# Patient Record
Sex: MALE | Race: WHITE | URBAN - METROPOLITAN AREA
[De-identification: names, ages, dates, MRNs, and addresses within clinical notes are randomized per-mention and may not be internally consistent; named-entity substitution may affect disease eponyms.]

---

## 2019-03-07 ENCOUNTER — APPOINTMENT (RX ONLY)
Dept: URBAN - METROPOLITAN AREA CLINIC 16 | Facility: CLINIC | Age: 44
Setting detail: DERMATOLOGY
End: 2019-03-07

## 2019-03-07 DIAGNOSIS — L72.0 EPIDERMAL CYST: ICD-10-CM

## 2019-03-07 DIAGNOSIS — D17 BENIGN LIPOMATOUS NEOPLASM: ICD-10-CM

## 2019-03-07 PROBLEM — M21.70 UNEQUAL LIMB LENGTH (ACQUIRED), UNSPECIFIED SITE: Status: ACTIVE | Noted: 2019-03-07

## 2019-03-07 PROBLEM — D17.21 BENIGN LIPOMATOUS NEOPLASM OF SKIN AND SUBCUTANEOUS TISSUE OF RIGHT ARM: Status: ACTIVE | Noted: 2019-03-07

## 2019-03-07 PROCEDURE — 99202 OFFICE O/P NEW SF 15 MIN: CPT

## 2019-03-07 PROCEDURE — ? COUNSELING

## 2019-03-07 PROCEDURE — ? DIAGNOSIS COMMENT

## 2019-03-07 ASSESSMENT — LOCATION SIMPLE DESCRIPTION DERM
LOCATION SIMPLE: RIGHT UPPER ARM
LOCATION SIMPLE: POSTERIOR NECK

## 2019-03-07 ASSESSMENT — LOCATION ZONE DERM
LOCATION ZONE: NECK
LOCATION ZONE: ARM

## 2019-03-07 ASSESSMENT — LOCATION DETAILED DESCRIPTION DERM
LOCATION DETAILED: RIGHT MEDIAL TRAPEZIAL NECK
LOCATION DETAILED: RIGHT ANTERIOR DISTAL UPPER ARM

## 2019-03-07 NOTE — PROCEDURE: DIAGNOSIS COMMENT
Comment: Schedule for excision
Detail Level: Simple
Comment: Patient with right hand slightly bigger than left hand. Normal variant - likely due to overuse of right (dominant) hand. No further work up indicated.